# Patient Record
(demographics unavailable — no encounter records)

---

## 2024-10-08 NOTE — HISTORY OF PRESENT ILLNESS
[FreeTextEntry1] : 10/8/24 -   3/12/24 - Patient reports L lower CP that is sharp non-tender, non-exertional and non-pleuritic. Patient denies SOB. Patient is on ASA 81 mg. His A1C is greater than 7. I advised patient to consider CTA.  CTA of the coronary arteries showed mild CAD with calcium score 481.  Chest CT portion showed moderate emphysema. He is on Pravastatin 40 mg and Ezetimibe 10 mg. I advised patient to switch Pravastatin 40 mg to Atorvastatin 40 mg.  3/2/23 - Patient feels OK.  Patient reports occasional focal left-sided chest discomfort not related to exertion, lasting seconds, occurring 1-2 times a months.  Patient thinks that it may be related to his biliary stent which has recently been removed.  I advised patient to undergo an echocardiogram.  He has PAD so he can't walk on the treadmill.  I will consider CTA of the coronary arteries if symptom becomes worse. Patient reports  right leg claudication. I advised patient to undergo an ISABEL.  10/22/19 - Patient is here for cardiac clearance before kidney stone surgery.  Patient denies CP, SOB, palpitations, or lightheadedness.  Patient was found to have blockage in his leg and has pain when he walks.  Patient underwent a treadmill stress test and completed 5 minutes of Flynn protocol.  There was no ECG evidence of ischemia.

## 2024-10-08 NOTE — REASON FOR VISIT
[FreeTextEntry1] : 68 year-old male with mild CAD by CTA, elevated calcium score 481, HTN, DM, HLD, PAD of right lower extremity, presents for followup.    Patient was last seen on 3/12/24 - Patient reports L lower CP that is sharp non-tender, non-exertional and non-pleuritic. Patient denies SOB. Patient is on ASA 81 mg. His A1C is greater than 7. I advised patient to consider CTA.  CTA of the coronary arteries showed mild CAD with calcium score 481.  Chest CT portion showed moderate emphysema. He is on Pravastatin 40 mg and Ezetimibe 10 mg. I advised patient to switch Pravastatin 40 mg to Atorvastatin 40 mg.  He is on Losartan HCTZ 100-25 mg, Amlodipine 5 mg for HTN.  He is on Atorvastatin 40 mg for HLD.  He is on Metformin 1000 mg and Jardiance 25 mg for DM.  ** Patient underwent an echocardiogram 3/2/23 and it showed normal LV function without significant valvular pathology.     Patient underwent an echocardiogram 5/23/12 and it showed normal LV function without significant valvular pathology.    Patient underwent a pharmacologic nuclear stress test 6/8/12 and it showed normal myocardial perfusion.  His symptom was felt to be musculoskeletal in etiology.

## 2024-10-08 NOTE — PHYSICAL EXAM
[General Appearance - Well Developed] : well developed [Normal Appearance] : normal appearance [Well Groomed] : well groomed [General Appearance - Well Nourished] : well nourished [No Deformities] : no deformities [General Appearance - In No Acute Distress] : no acute distress [Normal Conjunctiva] : the conjunctiva exhibited no abnormalities [Eyelids - No Xanthelasma] : the eyelids demonstrated no xanthelasmas [Normal Oral Mucosa] : normal oral mucosa [No Oral Pallor] : no oral pallor [No Oral Cyanosis] : no oral cyanosis [Normal Jugular Venous A Waves Present] : normal jugular venous A waves present [Normal Jugular Venous V Waves Present] : normal jugular venous V waves present [No Jugular Venous Manjarrez A Waves] : no jugular venous manjarrez A waves [Respiration, Rhythm And Depth] : normal respiratory rhythm and effort [Exaggerated Use Of Accessory Muscles For Inspiration] : no accessory muscle use [Auscultation Breath Sounds / Voice Sounds] : lungs were clear to auscultation bilaterally [Heart Rate And Rhythm] : heart rate and rhythm were normal [Heart Sounds] : normal S1 and S2 [Arterial Pulses Normal] : the arterial pulses were normal [Edema] : no peripheral edema present [Abdomen Soft] : soft [Abdomen Tenderness] : non-tender [Abdomen Mass (___ Cm)] : no abdominal mass palpated [Nail Clubbing] : no clubbing of the fingernails [Cyanosis, Localized] : no localized cyanosis [Petechial Hemorrhages (___cm)] : no petechial hemorrhages [] : no ischemic changes [Oriented To Time, Place, And Person] : oriented to person, place, and time [Affect] : the affect was normal [Mood] : the mood was normal [No Anxiety] : not feeling anxious [FreeTextEntry1] : Walking is limited by PAD.

## 2024-11-04 NOTE — ASSESSMENT
[FreeTextEntry1] : Patient is a 67 yo M who presents for chronic nephrolithiasis. S/p prior URS.  Stable punctate b/l intrarenal stones on sono today Cont stone diet F/u 1 yr for repeat renal sono

## 2024-11-04 NOTE — HISTORY OF PRESENT ILLNESS
[FreeTextEntry1] : Patient is a 69 yo M who presents for f/u of kidney stones and h/o L ureteral stone s/p L URS.  HPI: He had CT urogram with PCP - 9/30/19 showed a 9mm left mid ureteral stone with adjacent 5 mm stone and intrarenal stones bilaterally. S/p b/l URS 10/2019.  11/4/2024 Here for f/u. He is doing well. No flank pain. No dysuria or hematuria. No fever/chills. No change. Renal sono today shows bilateral punctate stones - stable. No significant change from prior sono 1 yr ago.

## 2025-06-10 NOTE — REASON FOR VISIT
[FreeTextEntry1] : 69 year-old male with mild CAD by CTA, elevated calcium score 481, HTN, DM, HLD, PAD of right lower extremity, presents for followup.    Patient was last seen on 10/8/24 - Patient reports L lower CP that is sharp, non-tender and not related to exertion lasting seconds. Patient reports palpitations on stairs. Patient denies SOB. Patient is on ASA 81 mg, Atorvastatin 40 mg, I advised patient to start on Metoprolol ER 25 mg FU in 6 months.  He is on ASA 81 mg and Atorvastatin 40 mg for CAD.  He is on Metoprolol ER 25 mg, Losartan HCTZ 100-25 mg, Amlodipine 5 mg for HTN.  He is on Metformin 1000 mg and Jardiance 25 mg for DM.  CTA of the coronary arteries 4/15/24 showed mild CAD with calcium score 481.  Chest CT portion showed moderate emphysema. He is on Pravastatin 40 mg and Ezetimibe 10 mg. I advised patient to switch Pravastatin 40 mg to Atorvastatin 40 mg.  Patient underwent an echocardiogram 3/2/23 and it showed normal LV function without significant valvular pathology.     Patient underwent an echocardiogram 5/23/12 and it showed normal LV function without significant valvular pathology.    Patient underwent a pharmacologic nuclear stress test 6/8/12 and it showed normal myocardial perfusion.  His symptom was felt to be musculoskeletal in etiology.

## 2025-06-10 NOTE — PHYSICAL EXAM
[General Appearance - Well Developed] : well developed [Normal Appearance] : normal appearance [Well Groomed] : well groomed [General Appearance - Well Nourished] : well nourished [General Appearance - In No Acute Distress] : no acute distress [No Deformities] : no deformities [Eyelids - No Xanthelasma] : the eyelids demonstrated no xanthelasmas [Normal Conjunctiva] : the conjunctiva exhibited no abnormalities [No Oral Pallor] : no oral pallor [Normal Oral Mucosa] : normal oral mucosa [No Oral Cyanosis] : no oral cyanosis [Normal Jugular Venous A Waves Present] : normal jugular venous A waves present [Normal Jugular Venous V Waves Present] : normal jugular venous V waves present [No Jugular Venous Manjarrez A Waves] : no jugular venous manjarrez A waves [Respiration, Rhythm And Depth] : normal respiratory rhythm and effort [Exaggerated Use Of Accessory Muscles For Inspiration] : no accessory muscle use [Auscultation Breath Sounds / Voice Sounds] : lungs were clear to auscultation bilaterally [Heart Rate And Rhythm] : heart rate and rhythm were normal [Arterial Pulses Normal] : the arterial pulses were normal [Heart Sounds] : normal S1 and S2 [Edema] : no peripheral edema present [Abdomen Soft] : soft [Abdomen Tenderness] : non-tender [Abdomen Mass (___ Cm)] : no abdominal mass palpated [Nail Clubbing] : no clubbing of the fingernails [Cyanosis, Localized] : no localized cyanosis [Petechial Hemorrhages (___cm)] : no petechial hemorrhages [Oriented To Time, Place, And Person] : oriented to person, place, and time [Affect] : the affect was normal [] : no ischemic changes [Mood] : the mood was normal [No Anxiety] : not feeling anxious [FreeTextEntry1] : 2/6 JACK heard at [facility].

## 2025-06-10 NOTE — HISTORY OF PRESENT ILLNESS
[FreeTextEntry1] : 6/10/25 - Patient has been stable.  Patient denies CP, SOB, palpitations, or lightheadedness.  Patient reports right LE claudication.  He is on ASA 81 mg and Atorvastatin 40 mg for CAD.  He is on Metoprolol ER 25 mg, Losartan HCTZ 100-25 mg, Amlodipine 5 mg for HTN.  He is on Metformin 1000 mg and Jardiance 25 mg for DM.  /77 HR 74.  ECG showed NSR, no STTw changes.  I advised patient to start Cilostazol 50 mg BID for claudication.  FU 6 months.  10/8/24 - Patient reports L lower CP that is sharp, non-tender and not related to exertion lasting seconds. Patient reports palpitations on stairs. Patient denies SOB. Patient is on ASA 81 mg, Atorvastatin 40 mg, I advised patient to start on Metoprolol ER 25 mg FU in 6 months.   3/12/24 - Patient reports L lower CP that is sharp non-tender, non-exertional and non-pleuritic. Patient denies SOB. Patient is on ASA 81 mg. His A1C is greater than 7. I advised patient to consider CTA.  CTA of the coronary arteries showed mild CAD with calcium score 481.  Chest CT portion showed moderate emphysema. He is on Pravastatin 40 mg and Ezetimibe 10 mg. I advised patient to switch Pravastatin 40 mg to Atorvastatin 40 mg.  3/2/23 - Patient feels OK.  Patient reports occasional focal left-sided chest discomfort not related to exertion, lasting seconds, occurring 1-2 times a months.  Patient thinks that it may be related to his biliary stent which has recently been removed.  I advised patient to undergo an echocardiogram.  He has PAD so he can't walk on the treadmill.  I will consider CTA of the coronary arteries if symptom becomes worse. Patient reports  right leg claudication. I advised patient to undergo an ISABEL.  10/22/19 - Patient is here for cardiac clearance before kidney stone surgery.  Patient denies CP, SOB, palpitations, or lightheadedness.  Patient was found to have blockage in his leg and has pain when he walks.  Patient underwent a treadmill stress test and completed 5 minutes of Flynn protocol.  There was no ECG evidence of ischemia.